# Patient Record
Sex: MALE | NOT HISPANIC OR LATINO | URBAN - METROPOLITAN AREA
[De-identification: names, ages, dates, MRNs, and addresses within clinical notes are randomized per-mention and may not be internally consistent; named-entity substitution may affect disease eponyms.]

---

## 2023-11-10 ENCOUNTER — NURSE TRIAGE (OUTPATIENT)
Dept: NURSING | Facility: CLINIC | Age: 23
End: 2023-11-10

## 2023-11-10 ENCOUNTER — OFFICE VISIT (OUTPATIENT)
Dept: URGENT CARE | Facility: URGENT CARE | Age: 23
End: 2023-11-10
Payer: COMMERCIAL

## 2023-11-10 VITALS
RESPIRATION RATE: 18 BRPM | HEART RATE: 60 BPM | TEMPERATURE: 98.1 F | DIASTOLIC BLOOD PRESSURE: 85 MMHG | OXYGEN SATURATION: 99 % | SYSTOLIC BLOOD PRESSURE: 126 MMHG | WEIGHT: 220 LBS

## 2023-11-10 DIAGNOSIS — H93.8X1 EAR PRESSURE, RIGHT: Primary | ICD-10-CM

## 2023-11-10 DIAGNOSIS — H61.22 IMPACTED CERUMEN OF LEFT EAR: ICD-10-CM

## 2023-11-10 DIAGNOSIS — H61.21 IMPACTED CERUMEN OF RIGHT EAR: ICD-10-CM

## 2023-11-10 PROCEDURE — 99203 OFFICE O/P NEW LOW 30 MIN: CPT | Mod: 25 | Performed by: PHYSICIAN ASSISTANT

## 2023-11-10 PROCEDURE — 69209 REMOVE IMPACTED EAR WAX UNI: CPT | Mod: 50 | Performed by: PHYSICIAN ASSISTANT

## 2023-11-10 NOTE — TELEPHONE ENCOUNTER
Right Ear ear wax impaction with muffled hearing loss.     Recommend [facility] within 3 days., patient states he is going to go today.     Reason for Disposition   Complete hearing loss in either ear    Additional Information   Negative: Sudden onset of ear pain or bleeding after long - thin object was inserted into the ear canal (e.g., pencil, Q-tip)   Negative: [1] Ear pain after ear syringing (i.e., squirting liquid into ear canal to remove wax) AND [2] severe   Negative: [1] Ear pain after ear syringing (i.e., squirting liquid into ear canal to remove wax) AND [2] persists > 1 hour   Negative: Walking is very unsteady or feels very dizzy  (Exception: Brief dizziness that occurs with ear syringing.)   Negative: Redness or swelling of outer ear (ear lobe)   Negative: Pus from the ear canal (e.g., yellow or green discharge)   Negative: History of ear drum perforation, tubes or ear surgery    Protocols used: Earwax-A-AH

## 2023-11-10 NOTE — PROGRESS NOTES
Assessment & Plan     Ear pressure, right    Patient wears ear buds daily and thinks this may be contributing to his ear pressure  On exam both ears are occluded with wax    Impacted cerumen of left ear    PROCEDURE:    Ear was evaluated and found to have impacted wax  Ear irrigation was performed and was was removed  Patient tolerated procedure well  May use OTC debrox drops prn in the future      Impacted earwax is a buildup of the natural wax in the ear. Tiny glands in your ear make substances that combine with dead skin cells to form earwax. Earwax helps protect your ear canal from water, dirt, infection, and injury. Over time, earwax travels from the inner part of your ear canal to the entrance of the canal. Then it falls away naturally. But in some cases, it can t travel to the entrance of the canal. This may be because of a health condition or objects put in the ear. With age, earwax tends to become harder and less fluid.  Placing objects in ear like ear buds or using q-tips can cause wax to build up in the ears.     - SD REMOVAL IMPACTED CERUMEN IRRIGATION/LVG UNILAT    Impacted cerumen of right ear    PROCEDURE:    Ear was evaluated and found to have impacted wax  Ear irrigation was performed and was was removed  Patient tolerated procedure well  May use OTC debrox drops prn in the future    - SD REMOVAL IMPACTED CERUMEN IRRIGATION/LVG UNILAT      No follow-ups on file.    Jorge Alberto Preciado, [facility], PA-C  [facility]   Lance is a 23 year old, presenting for the following health issues:  Urgent Care (Right ear discomfort and muffled noise )      HPI   Review of Systems   Constitutional, HEENT, cardiovascular, pulmonary, gi and gu systems are negative, except as otherwise noted.      Objective    /85   Pulse 60   Temp 98.1  F (36.7  C)   Resp 18   Wt 99.8 kg (220 lb)   SpO2 99%   There is no height or weight on file to calculate BMI.  Physical Exam   GENERAL:  healthy, alert and no distress  EYES: Eyes grossly normal to inspection, PERRL and conjunctivae and sclerae normal  HENT: right ear: occluded with wax, left ear: occluded with wax, nose and mouth without ulcers or lesions, oropharynx clear, and oral mucous membranes moist  NECK: no adenopathy, no asymmetry, masses, or scars and thyroid normal to palpation  MS: no gross musculoskeletal defects noted, no edema  SKIN: no suspicious lesions or rashes  NEURO: Normal strength and tone, mentation intact and speech normal  PSYCH: mentation appears normal, affect normal/bright